# Patient Record
Sex: FEMALE | Race: OTHER | HISPANIC OR LATINO | URBAN - METROPOLITAN AREA
[De-identification: names, ages, dates, MRNs, and addresses within clinical notes are randomized per-mention and may not be internally consistent; named-entity substitution may affect disease eponyms.]

---

## 2021-04-14 ENCOUNTER — OFFICE VISIT (OUTPATIENT)
Dept: URBAN - METROPOLITAN AREA CLINIC 63 | Facility: CLINIC | Age: 66
End: 2021-04-14

## 2021-06-23 ENCOUNTER — OFFICE VISIT (OUTPATIENT)
Dept: URBAN - METROPOLITAN AREA CLINIC 63 | Facility: CLINIC | Age: 66
End: 2021-06-23

## 2021-08-13 ENCOUNTER — OFFICE VISIT (OUTPATIENT)
Dept: URBAN - METROPOLITAN AREA SURGERY CENTER 4 | Facility: SURGERY CENTER | Age: 66
End: 2021-08-13

## 2021-08-27 ENCOUNTER — OFFICE VISIT (OUTPATIENT)
Dept: URBAN - METROPOLITAN AREA SURGERY CENTER 4 | Facility: SURGERY CENTER | Age: 66
End: 2021-08-27

## 2021-08-31 LAB — PATHOLOGY (INDENTED REPORT): (no result)

## 2021-09-21 ENCOUNTER — OFFICE VISIT (OUTPATIENT)
Dept: URBAN - METROPOLITAN AREA TELEHEALTH 2 | Facility: TELEHEALTH | Age: 66
End: 2021-09-21

## 2021-11-30 ENCOUNTER — OFFICE VISIT (OUTPATIENT)
Dept: URBAN - METROPOLITAN AREA TELEHEALTH 2 | Facility: TELEHEALTH | Age: 66
End: 2021-11-30

## 2021-12-06 ENCOUNTER — OFFICE VISIT (OUTPATIENT)
Dept: URBAN - METROPOLITAN AREA CLINIC 63 | Facility: CLINIC | Age: 66
End: 2021-12-06

## 2022-07-09 ENCOUNTER — TELEPHONE ENCOUNTER (OUTPATIENT)
Dept: URBAN - METROPOLITAN AREA CLINIC 121 | Facility: CLINIC | Age: 67
End: 2022-07-09

## 2022-07-09 RX ORDER — ZOLPIDEM TARTRATE 5 MG/1
TABLET, FILM COATED ORAL ONCE A DAY
Refills: 0 | OUTPATIENT
Start: 2021-06-23 | End: 2021-12-06

## 2022-07-09 RX ORDER — CLOPIDOGREL 75 MG/1
TABLET ORAL ONCE A DAY
Refills: 0 | OUTPATIENT
Start: 2021-12-06 | End: 2021-12-06

## 2022-07-09 RX ORDER — CANDESARTAN CILEXETIL 16 MG/1
TABLET ORAL ONCE A DAY
Refills: 0 | OUTPATIENT
Start: 2021-06-23 | End: 2021-12-06

## 2022-07-09 RX ORDER — LOTEPREDNOL ETABONATE 5 MG/ML
SUSPENSION/ DROPS OPHTHALMIC ONCE A DAY
Refills: 0 | OUTPATIENT
Start: 2021-06-23 | End: 2021-12-06

## 2022-07-09 RX ORDER — FLUTICASONE PROPIONATE 50 UG/1
SPRAY, METERED NASAL ONCE A DAY
Refills: 0 | OUTPATIENT
Start: 2021-06-23 | End: 2021-12-06

## 2022-07-09 RX ORDER — AZELASTINE HYDROCHLORIDE 137 UG/1
SPRAY, METERED NASAL ONCE A DAY
Refills: 0 | OUTPATIENT
Start: 2021-06-23 | End: 2021-12-06

## 2022-07-09 RX ORDER — FLUTICASONE PROPIONATE 50 UG/1
SPRAY, METERED NASAL ONCE A DAY
Refills: 0 | OUTPATIENT
Start: 2021-12-06 | End: 2021-12-06

## 2022-07-09 RX ORDER — CLOPIDOGREL 75 MG/1
TABLET ORAL ONCE A DAY
Refills: 0 | OUTPATIENT
Start: 2021-06-23 | End: 2021-12-06

## 2022-07-09 RX ORDER — HYDROCHLOROTHIAZIDE 25 MG/1
TABLET ORAL ONCE A DAY
Refills: 0 | OUTPATIENT
Start: 2021-06-23 | End: 2021-12-06

## 2022-07-09 RX ORDER — SERTRALINE 25 MG/1
TABLET, FILM COATED ORAL ONCE A DAY
Refills: 0 | OUTPATIENT
Start: 2021-06-23 | End: 2021-12-06

## 2022-07-09 RX ORDER — PANTOPRAZOLE SODIUM 40 MG/1
TABLET, DELAYED RELEASE ORAL ONCE A DAY
Refills: 0 | OUTPATIENT
Start: 2021-06-23 | End: 2021-06-23

## 2022-07-10 ENCOUNTER — TELEPHONE ENCOUNTER (OUTPATIENT)
Dept: URBAN - METROPOLITAN AREA CLINIC 121 | Facility: CLINIC | Age: 67
End: 2022-07-10

## 2022-07-10 RX ORDER — SERTRALINE 25 MG/1
TABLET, FILM COATED ORAL ONCE A DAY
Refills: 0 | Status: ACTIVE | COMMUNITY
Start: 2021-12-06

## 2022-07-10 RX ORDER — LORATADINE 5 MG
ONCE A DAY TABLET,CHEWABLE ORAL ONCE A DAY
Refills: 1 | Status: ACTIVE | COMMUNITY
Start: 2021-12-06

## 2022-07-10 RX ORDER — HYDROCHLOROTHIAZIDE 25 MG/1
TABLET ORAL ONCE A DAY
Refills: 0 | Status: ACTIVE | COMMUNITY
Start: 2021-12-06

## 2022-07-10 RX ORDER — ZOLPIDEM TARTRATE 5 MG/1
TABLET, FILM COATED ORAL ONCE A DAY
Refills: 0 | Status: ACTIVE | COMMUNITY
Start: 2021-12-06

## 2022-07-10 RX ORDER — PANTOPRAZOLE SODIUM 40 MG/1
ONCE A DAY TABLET, DELAYED RELEASE ORAL ONCE A DAY
Refills: 0 | Status: ACTIVE | COMMUNITY
Start: 2021-06-23

## 2022-07-10 RX ORDER — LOTEPREDNOL ETABONATE 5 MG/ML
SUSPENSION/ DROPS OPHTHALMIC ONCE A DAY
Refills: 0 | Status: ACTIVE | COMMUNITY
Start: 2021-12-06

## 2022-07-10 RX ORDER — CANDESARTAN CILEXETIL 16 MG/1
TABLET ORAL ONCE A DAY
Refills: 0 | Status: ACTIVE | COMMUNITY
Start: 2021-12-06

## 2022-07-10 RX ORDER — AZELASTINE HYDROCHLORIDE 137 UG/1
SPRAY, METERED NASAL ONCE A DAY
Refills: 0 | Status: ACTIVE | COMMUNITY
Start: 2021-12-06

## 2022-10-05 ENCOUNTER — OFFICE VISIT (OUTPATIENT)
Dept: URBAN - METROPOLITAN AREA CLINIC 63 | Facility: CLINIC | Age: 67
End: 2022-10-05

## 2022-10-25 ENCOUNTER — OFFICE VISIT (OUTPATIENT)
Dept: URBAN - METROPOLITAN AREA CLINIC 63 | Facility: CLINIC | Age: 67
End: 2022-10-25
Payer: COMMERCIAL

## 2022-10-25 ENCOUNTER — WEB ENCOUNTER (OUTPATIENT)
Dept: URBAN - METROPOLITAN AREA CLINIC 63 | Facility: CLINIC | Age: 67
End: 2022-10-25

## 2022-10-25 VITALS
OXYGEN SATURATION: 97 % | HEIGHT: 60 IN | WEIGHT: 191.8 LBS | TEMPERATURE: 96.5 F | SYSTOLIC BLOOD PRESSURE: 130 MMHG | HEART RATE: 73 BPM | DIASTOLIC BLOOD PRESSURE: 86 MMHG | BODY MASS INDEX: 37.66 KG/M2

## 2022-10-25 DIAGNOSIS — R10.819 ABDOMINAL TENDERNESS, UNSPECIFIED SITE: ICD-10-CM

## 2022-10-25 PROCEDURE — 99214 OFFICE O/P EST MOD 30 MIN: CPT | Performed by: INTERNAL MEDICINE

## 2022-10-25 RX ORDER — SUCRALFATE 1 G/1
1 TABLET ON AN EMPTY STOMACH TABLET ORAL TWICE A DAY
Status: ACTIVE | COMMUNITY

## 2022-10-25 RX ORDER — HYDROCHLOROTHIAZIDE 25 MG/1
TABLET ORAL ONCE A DAY
Refills: 0 | Status: ACTIVE | COMMUNITY
Start: 2021-12-06

## 2022-10-25 RX ORDER — SERTRALINE 25 MG/1
TABLET, FILM COATED ORAL ONCE A DAY
Refills: 0 | Status: ACTIVE | COMMUNITY
Start: 2021-12-06

## 2022-10-25 RX ORDER — TRAZODONE HYDROCHLORIDE 50 MG/1
1 TABLET AT BEDTIME AS NEEDED TABLET ORAL ONCE A DAY
Status: ACTIVE | COMMUNITY

## 2022-10-25 RX ORDER — KRILL/OM-3/DHA/EPA/PHOSPHO/AST 1000-230MG
1 TABLET CAPSULE ORAL ONCE A DAY
Status: ACTIVE | COMMUNITY

## 2022-10-25 RX ORDER — TIOTROPIUM BROMIDE 18 UG/1
1 CAPSULE BY INHALING THE CONTENTS OF THE CAPSULE USING THE HANDIHALER DEVICE CAPSULE ORAL; RESPIRATORY (INHALATION) ONCE A DAY
Status: ACTIVE | COMMUNITY

## 2022-10-25 RX ORDER — CANDESARTAN CILEXETIL 16 MG/1
TABLET ORAL ONCE A DAY
Refills: 0 | Status: ACTIVE | COMMUNITY
Start: 2021-12-06

## 2022-10-25 RX ORDER — LOTEPREDNOL ETABONATE 5 MG/ML
SUSPENSION/ DROPS OPHTHALMIC ONCE A DAY
Refills: 0 | Status: ACTIVE | COMMUNITY
Start: 2021-12-06

## 2022-10-25 RX ORDER — MELOXICAM 15 MG/1
1 TABLET TABLET ORAL ONCE A DAY
Status: ACTIVE | COMMUNITY

## 2022-10-25 RX ORDER — PANTOPRAZOLE SODIUM 40 MG/1
ONCE A DAY TABLET, DELAYED RELEASE ORAL ONCE A DAY
Refills: 0 | Status: ACTIVE | COMMUNITY
Start: 2021-06-23

## 2022-10-25 RX ORDER — AZELASTINE HYDROCHLORIDE 137 UG/1
SPRAY, METERED NASAL ONCE A DAY
Refills: 0 | Status: ACTIVE | COMMUNITY
Start: 2021-12-06

## 2022-10-25 NOTE — HPI-TODAY'S VISIT:
Patient, with previous work-up done in [place] with a strong family history of colorectal cancer patient brother had colorectal cancer and another brother had gastric cancer.  Last colonoscopy was done 5 years ago with personal history of polyps.  We will plan to do surveillance colonoscopy.  Patient never had an upper endoscopy done, will plan upper endoscopy and colonoscopy the same day. Patient with chronic GERD on PPI. Will do diet and treartment for reflux, patient s/p cholecystectomy. Patient with plavix unclear the indication will do referral to cardiology  12/21:  Patient had upper endoscopy and colonoscopy 27 August of this year upper endoscopy showed normal esophagus with chronic gastritis and normal duodenum and colonoscopy show stricture in the sigmoid colon not able to pass regular scope the scope was changed to a pediatric scope and I was not able to pass that area reason why patient was sent for virtual colonography that same day patient had virtual colonography with the findings of bowel preparation and insufflation are adequate.  Mild diverticulosis involving the short segment of the distal descending colon which is mildly narrowing with minimal wall thickening.  No colon polyps or masses were visualized s/p cholecystectomy hepatic asteatosis and coronary artery calcifications degenerative changes throughout the spine and involving both hips with this finding patient will need to had a repeat colonoscopy or virtual colonography in 5 years.  Pathology report from the endoscopy showed normal duodenum, reactive gastropathy and chronic inactive gastritis with focal intestinal metaplasia negative for H. pylori negative for dysplasia.  Lower esophageal biopsies show fragments of squamous mucosa with gastroesophageal reflux negative for esophagitis, dysplasia or malignancy.  Negative for intestinal metaplasia.  We will continue treatment for acid reflux and diet.  Will consider repeated endoscopy in 3 years. Will need diet, exercise and weight loss, Will follow in 6 months. 10/22: Patient comes with  LLQ pain radiated to the left side of the abdomen.  With occasional diarrhea at this moment has no diarrhea but has chronic pain. The patient had a fall last July she fall on her  the left side. Patient had CT scan for renal stone and as per patient was normal.   Patient denies fever, bleeding but pain associated with occasional diarrhea. patient had terndernes to deep palpation to the LLQ. Patient has personal h/o sigmoid stensis and diverticulosis. May consider trial with antibiotics, and close follow up. Will do referral to colorectal for evaluation.

## 2022-11-15 ENCOUNTER — TELEPHONE ENCOUNTER (OUTPATIENT)
Dept: URBAN - METROPOLITAN AREA CLINIC 63 | Facility: CLINIC | Age: 67
End: 2022-11-15

## 2022-12-27 ENCOUNTER — OFFICE VISIT (OUTPATIENT)
Dept: URBAN - METROPOLITAN AREA CLINIC 63 | Facility: CLINIC | Age: 67
End: 2022-12-27

## 2023-01-25 ENCOUNTER — OFFICE VISIT (OUTPATIENT)
Dept: URBAN - METROPOLITAN AREA CLINIC 63 | Facility: CLINIC | Age: 68
End: 2023-01-25

## 2023-03-08 ENCOUNTER — OFFICE VISIT (OUTPATIENT)
Dept: URBAN - METROPOLITAN AREA CLINIC 63 | Facility: CLINIC | Age: 68
End: 2023-03-08
Payer: COMMERCIAL

## 2023-03-08 VITALS
HEART RATE: 76 BPM | BODY MASS INDEX: 37.69 KG/M2 | HEIGHT: 60 IN | WEIGHT: 192 LBS | SYSTOLIC BLOOD PRESSURE: 112 MMHG | DIASTOLIC BLOOD PRESSURE: 82 MMHG | TEMPERATURE: 96.6 F | OXYGEN SATURATION: 95 %

## 2023-03-08 DIAGNOSIS — R10.819 ABDOMINAL TENDERNESS, UNSPECIFIED SITE: ICD-10-CM

## 2023-03-08 DIAGNOSIS — Z80.0 FAMILY HISTORY OF MALIGNANT NEOPLASM OF DIGESTIVE ORGANS: ICD-10-CM

## 2023-03-08 DIAGNOSIS — K56.609 UNSPECIFIED INTESTINAL OBSTRUCTION, UNSPECIFIED AS TO PARTIAL VERSUS COMPLETE OBSTRUCTION: ICD-10-CM

## 2023-03-08 DIAGNOSIS — K57.32 DIVERTICULITIS OF LARGE INTESTINE WITHOUT PERFORATION OR ABSCESS WITHOUT BLEEDING: ICD-10-CM

## 2023-03-08 PROBLEM — 4494009: Status: ACTIVE | Noted: 2023-03-08

## 2023-03-08 PROCEDURE — 99213 OFFICE O/P EST LOW 20 MIN: CPT | Performed by: INTERNAL MEDICINE

## 2023-03-08 RX ORDER — CANDESARTAN CILEXETIL 16 MG/1
TABLET ORAL ONCE A DAY
Refills: 0 | Status: ACTIVE | COMMUNITY
Start: 2021-12-06

## 2023-03-08 RX ORDER — SUCRALFATE 1 G/1
1 TABLET ON AN EMPTY STOMACH TABLET ORAL TWICE A DAY
Status: ACTIVE | COMMUNITY

## 2023-03-08 RX ORDER — PANTOPRAZOLE SODIUM 40 MG/1
ONCE A DAY TABLET, DELAYED RELEASE ORAL ONCE A DAY
Refills: 0 | Status: ACTIVE | COMMUNITY
Start: 2021-06-23

## 2023-03-08 RX ORDER — KRILL/OM-3/DHA/EPA/PHOSPHO/AST 1000-230MG
1 TABLET CAPSULE ORAL ONCE A DAY
Status: ACTIVE | COMMUNITY

## 2023-03-08 RX ORDER — MELOXICAM 15 MG/1
1 TABLET TABLET ORAL ONCE A DAY
Status: ACTIVE | COMMUNITY

## 2023-03-08 RX ORDER — AZELASTINE HYDROCHLORIDE 137 UG/1
SPRAY, METERED NASAL ONCE A DAY
Refills: 0 | Status: ACTIVE | COMMUNITY
Start: 2021-12-06

## 2023-03-08 RX ORDER — TIOTROPIUM BROMIDE 18 UG/1
1 CAPSULE BY INHALING THE CONTENTS OF THE CAPSULE USING THE HANDIHALER DEVICE CAPSULE ORAL; RESPIRATORY (INHALATION) ONCE A DAY
Status: ACTIVE | COMMUNITY

## 2023-03-08 RX ORDER — LOTEPREDNOL ETABONATE 5 MG/ML
SUSPENSION/ DROPS OPHTHALMIC ONCE A DAY
Refills: 0 | Status: ACTIVE | COMMUNITY
Start: 2021-12-06

## 2023-03-08 NOTE — HPI-TODAY'S VISIT:
Patient, with previous work-up done in [place] with a strong family history of colorectal cancer patient brother had colorectal cancer and another brother had gastric cancer.  Last colonoscopy was done 5 years ago with personal history of polyps.  We will plan to do surveillance colonoscopy.  Patient never had an upper endoscopy done, will plan upper endoscopy and colonoscopy the same day. Patient with chronic GERD on PPI. Will do diet and treartment for reflux, patient s/p cholecystectomy. Patient with plavix unclear the indication will do referral to cardiology  12/21:  Patient had upper endoscopy and colonoscopy 27 August of this year upper endoscopy showed normal esophagus with chronic gastritis and normal duodenum and colonoscopy show stricture in the sigmoid colon not able to pass regular scope the scope was changed to a pediatric scope and I was not able to pass that area reason why patient was sent for virtual colonography that same day patient had virtual colonography with the findings of bowel preparation and insufflation are adequate.  Mild diverticulosis involving the short segment of the distal descending colon which is mildly narrowing with minimal wall thickening.  No colon polyps or masses were visualized s/p cholecystectomy hepatic asteatosis and coronary artery calcifications degenerative changes throughout the spine and involving both hips with this finding patient will need to had a repeat colonoscopy or virtual colonography in 5 years.  Pathology report from the endoscopy showed normal duodenum, reactive gastropathy and chronic inactive gastritis with focal intestinal metaplasia negative for H. pylori negative for dysplasia.  Lower esophageal biopsies show fragments of squamous mucosa with gastroesophageal reflux negative for esophagitis, dysplasia or malignancy.  Negative for intestinal metaplasia.  We will continue treatment for acid reflux and diet.  Will consider repeated endoscopy in 3 years. Will need diet, exercise and weight loss, Will follow in 6 months. 10/22: Patient comes with  LLQ pain radiated to the left side of the abdomen.  With occasional diarrhea at this moment has no diarrhea but has chronic pain. The patient had a fall last July she fall on her  the left side. Patient had CT scan for renal stone and as per patient was normal.   Patient denies fever, bleeding but pain associated with occasional diarrhea. patient had terndernes to deep palpation to the LLQ. Patient has personal h/o sigmoid stensis and diverticulosis. May consider trial with antibiotics, and close follow up. Will do referral to colorectal for evaluation. 3/23: Patient was seen by Dr Almaguer, with persistent LLQ , patient needs to make a decision, likely she will do partial resection of the sigmoid colon to avoid symptoms and further complications. Will defer management to colorectal.

## 2023-09-27 ENCOUNTER — OFFICE VISIT (OUTPATIENT)
Dept: URBAN - METROPOLITAN AREA CLINIC 63 | Facility: CLINIC | Age: 68
End: 2023-09-27

## 2023-12-05 ENCOUNTER — OFFICE VISIT (OUTPATIENT)
Dept: URBAN - METROPOLITAN AREA CLINIC 63 | Facility: CLINIC | Age: 68
End: 2023-12-05
Payer: COMMERCIAL

## 2023-12-05 ENCOUNTER — DASHBOARD ENCOUNTERS (OUTPATIENT)
Age: 68
End: 2023-12-05

## 2023-12-05 VITALS
DIASTOLIC BLOOD PRESSURE: 60 MMHG | OXYGEN SATURATION: 94 % | HEIGHT: 60 IN | HEART RATE: 73 BPM | BODY MASS INDEX: 36.12 KG/M2 | WEIGHT: 184 LBS | TEMPERATURE: 97.2 F | SYSTOLIC BLOOD PRESSURE: 118 MMHG

## 2023-12-05 DIAGNOSIS — K57.90 DIVERTICULOSIS: ICD-10-CM

## 2023-12-05 DIAGNOSIS — Z80.0 FAMILY HISTORY OF MALIGNANT NEOPLASM OF DIGESTIVE ORGANS: ICD-10-CM

## 2023-12-05 DIAGNOSIS — K56.609 UNSPECIFIED INTESTINAL OBSTRUCTION, UNSPECIFIED AS TO PARTIAL VERSUS COMPLETE OBSTRUCTION: ICD-10-CM

## 2023-12-05 DIAGNOSIS — K21.9 CHRONIC GERD: ICD-10-CM

## 2023-12-05 PROCEDURE — 99214 OFFICE O/P EST MOD 30 MIN: CPT | Performed by: INTERNAL MEDICINE

## 2023-12-05 RX ORDER — TIOTROPIUM BROMIDE 18 UG/1
1 CAPSULE BY INHALING THE CONTENTS OF THE CAPSULE USING THE HANDIHALER DEVICE CAPSULE ORAL; RESPIRATORY (INHALATION) ONCE A DAY
Status: ACTIVE | COMMUNITY

## 2023-12-05 RX ORDER — PANTOPRAZOLE SODIUM 40 MG/1
ONCE A DAY TABLET, DELAYED RELEASE ORAL ONCE A DAY
Refills: 0 | Status: ACTIVE | COMMUNITY
Start: 2021-06-23

## 2023-12-05 RX ORDER — CANDESARTAN CILEXETIL 16 MG/1
TABLET ORAL ONCE A DAY
Refills: 0 | Status: ACTIVE | COMMUNITY
Start: 2021-12-06

## 2023-12-05 RX ORDER — KRILL/OM-3/DHA/EPA/PHOSPHO/AST 1000-230MG
1 TABLET CAPSULE ORAL ONCE A DAY
Status: ACTIVE | COMMUNITY

## 2023-12-05 NOTE — HPI-TODAY'S VISIT:
Patient, with previous work-up done in [place] with a strong family history of colorectal cancer patient brother had colorectal cancer and another brother had gastric cancer.  Last colonoscopy was done 5 years ago with personal history of polyps.  We will plan to do surveillance colonoscopy.  Patient never had an upper endoscopy done, will plan upper endoscopy and colonoscopy the same day. Patient with chronic GERD on PPI. Will do diet and treartment for reflux, patient s/p cholecystectomy. Patient with plavix unclear the indication will do referral to cardiology  12/21:  Patient had upper endoscopy and colonoscopy 27 August of this year upper endoscopy showed normal esophagus with chronic gastritis and normal duodenum and colonoscopy show stricture in the sigmoid colon not able to pass regular scope the scope was changed to a pediatric scope and I was not able to pass that area reason why patient was sent for virtual colonography that same day patient had virtual colonography with the findings of bowel preparation and insufflation are adequate.  Mild diverticulosis involving the short segment of the distal descending colon which is mildly narrowing with minimal wall thickening.  No colon polyps or masses were visualized s/p cholecystectomy hepatic asteatosis and coronary artery calcifications degenerative changes throughout the spine and involving both hips with this finding patient will need to had a repeat colonoscopy or virtual colonography in 5 years.  Pathology report from the endoscopy showed normal duodenum, reactive gastropathy and chronic inactive gastritis with focal intestinal metaplasia negative for H. pylori negative for dysplasia.  Lower esophageal biopsies show fragments of squamous mucosa with gastroesophageal reflux negative for esophagitis, dysplasia or malignancy.  Negative for intestinal metaplasia.  We will continue treatment for acid reflux and diet.  Will consider repeated endoscopy in 3 years. Will need diet, exercise and weight loss, Will follow in 6 months. 10/22: Patient comes with  LLQ pain radiated to the left side of the abdomen.  With occasional diarrhea at this moment has no diarrhea but has chronic pain. The patient had a fall last July she fall on her  the left side. Patient had CT scan for renal stone and as per patient was normal.   Patient denies fever, bleeding but pain associated with occasional diarrhea. patient had terndernes to deep palpation to the LLQ. Patient has personal h/o sigmoid stensis and diverticulosis. May consider trial with antibiotics, and close follow up. Will do referral to colorectal for evaluation. 3/23: Patient was seen by Dr Almaguer, with persistent LLQ , patient needs to make a decision, likely she will do partial resection of the sigmoid colon to avoid symptoms and further complications. Will defer management to colorectal. 12/23: Patient is being seen after surgical treatment of the sigmoid stricture, patient had a laparoscopic assisted low anterior resection with spy fluorescence infrared imaging with intraoperative interpretation patient with diverticulosis mild because of the stricture, now healing well following good with regular bowel movement, will plan colonoscopy, next year, will wait at least 6-month after procedure that was done in October of this year to do a colonoscopy, evaluation of the right colon that was not able to visualize because of the stricture in the sigmoid colon.  Patient is doing well will follow-up as needed basis.

## 2024-09-18 ENCOUNTER — LAB OUTSIDE AN ENCOUNTER (OUTPATIENT)
Dept: URBAN - METROPOLITAN AREA CLINIC 63 | Facility: CLINIC | Age: 69
End: 2024-09-18

## 2024-09-18 ENCOUNTER — OFFICE VISIT (OUTPATIENT)
Dept: URBAN - METROPOLITAN AREA CLINIC 63 | Facility: CLINIC | Age: 69
End: 2024-09-18
Payer: COMMERCIAL

## 2024-09-18 VITALS
DIASTOLIC BLOOD PRESSURE: 70 MMHG | HEIGHT: 60 IN | WEIGHT: 188 LBS | HEART RATE: 75 BPM | TEMPERATURE: 97.4 F | OXYGEN SATURATION: 97 % | BODY MASS INDEX: 36.91 KG/M2 | SYSTOLIC BLOOD PRESSURE: 105 MMHG

## 2024-09-18 DIAGNOSIS — K22.9 DISEASE OF ESOPHAGUS, UNSPECIFIED: ICD-10-CM

## 2024-09-18 DIAGNOSIS — D12.6 BENIGN NEOPLASM OF COLON, UNSPECIFIED: ICD-10-CM

## 2024-09-18 DIAGNOSIS — K57.32 DIVERTICULITIS OF LARGE INTESTINE WITHOUT PERFORATION OR ABSCESS WITHOUT BLEEDING: ICD-10-CM

## 2024-09-18 DIAGNOSIS — Z80.0 FAMILY HISTORY OF MALIGNANT NEOPLASM OF DIGESTIVE ORGANS: ICD-10-CM

## 2024-09-18 PROCEDURE — 99214 OFFICE O/P EST MOD 30 MIN: CPT | Performed by: INTERNAL MEDICINE

## 2024-09-18 RX ORDER — CANDESARTAN CILEXETIL 16 MG/1
TABLET ORAL ONCE A DAY
Refills: 0 | Status: ACTIVE | COMMUNITY
Start: 2021-12-06

## 2024-09-18 RX ORDER — TIOTROPIUM BROMIDE 18 UG/1
1 CAPSULE BY INHALING THE CONTENTS OF THE CAPSULE USING THE HANDIHALER DEVICE CAPSULE ORAL; RESPIRATORY (INHALATION) ONCE A DAY
Status: ACTIVE | COMMUNITY

## 2024-09-18 RX ORDER — KRILL/OM-3/DHA/EPA/PHOSPHO/AST 1000-230MG
1 TABLET CAPSULE ORAL ONCE A DAY
Status: ACTIVE | COMMUNITY

## 2024-09-18 RX ORDER — PANTOPRAZOLE SODIUM 40 MG/1
ONCE A DAY TABLET, DELAYED RELEASE ORAL ONCE A DAY
Refills: 0 | Status: ACTIVE | COMMUNITY
Start: 2021-06-23

## 2024-09-18 NOTE — HPI-TODAY'S VISIT:
Patient, with previous work-up done in [place] with a strong family history of colorectal cancer patient brother had colorectal cancer and another brother had gastric cancer.  Last colonoscopy was done 5 years ago with personal history of polyps.  We will plan to do surveillance colonoscopy.  Patient never had an upper endoscopy done, will plan upper endoscopy and colonoscopy the same day. Patient with chronic GERD on PPI. Will do diet and treartment for reflux, patient s/p cholecystectomy. Patient with plavix unclear the indication will do referral to cardiology  12/21:  Patient had upper endoscopy and colonoscopy 27 August of this year upper endoscopy showed normal esophagus with chronic gastritis and normal duodenum and colonoscopy show stricture in the sigmoid colon not able to pass regular scope the scope was changed to a pediatric scope and I was not able to pass that area reason why patient was sent for virtual colonography that same day patient had virtual colonography with the findings of bowel preparation and insufflation are adequate.  Mild diverticulosis involving the short segment of the distal descending colon which is mildly narrowing with minimal wall thickening.  No colon polyps or masses were visualized s/p cholecystectomy hepatic asteatosis and coronary artery calcifications degenerative changes throughout the spine and involving both hips with this finding patient will need to had a repeat colonoscopy or virtual colonography in 5 years.  Pathology report from the endoscopy showed normal duodenum, reactive gastropathy and chronic inactive gastritis with focal intestinal metaplasia negative for H. pylori negative for dysplasia.  Lower esophageal biopsies show fragments of squamous mucosa with gastroesophageal reflux negative for esophagitis, dysplasia or malignancy.  Negative for intestinal metaplasia.  We will continue treatment for acid reflux and diet.  Will consider repeated endoscopy in 3 years. Will need diet, exercise and weight loss, Will follow in 6 months. 10/22: Patient comes with  LLQ pain radiated to the left side of the abdomen.  With occasional diarrhea at this moment has no diarrhea but has chronic pain. The patient had a fall last July she fall on her  the left side. Patient had CT scan for renal stone and as per patient was normal.   Patient denies fever, bleeding but pain associated with occasional diarrhea. patient had terndernes to deep palpation to the LLQ. Patient has personal h/o sigmoid stensis and diverticulosis. May consider trial with antibiotics, and close follow up. Will do referral to colorectal for evaluation. 3/23: Patient was seen by Dr Almaguer, with persistent LLQ , patient needs to make a decision, likely she will do partial resection of the sigmoid colon to avoid symptoms and further complications. Will defer management to colorectal. 12/23: Patient is being seen after surgical treatment of the sigmoid stricture, patient had a laparoscopic assisted low anterior resection with spy fluorescence infrared imaging with intraoperative interpretation patient with diverticulosis mild because of the stricture, now healing well following good with regular bowel movement, will plan colonoscopy, next year, will wait at least 6-month after procedure that was done in October of this year to do a colonoscopy, evaluation of the right colon that was not able to visualize because of the stricture in the sigmoid colon.  Patient is doing well will follow-up as needed basis. 9/24: Patient s/p partial resection of the colon sigmodectomy had sigmoid stricture  secondary to sigmoid diverticulosis, will plan colonoscopy. Patient with some discomfort but not pain, will do trial with probiotics and will avoid constipation.

## 2024-12-10 ENCOUNTER — OFFICE VISIT (OUTPATIENT)
Dept: URBAN - METROPOLITAN AREA SURGERY CENTER 4 | Facility: SURGERY CENTER | Age: 69
End: 2024-12-10

## 2025-01-08 ENCOUNTER — TELEPHONE ENCOUNTER (OUTPATIENT)
Dept: URBAN - METROPOLITAN AREA CLINIC 63 | Facility: CLINIC | Age: 70
End: 2025-01-08

## 2025-01-24 ENCOUNTER — CLAIMS CREATED FROM THE CLAIM WINDOW (OUTPATIENT)
Dept: URBAN - METROPOLITAN AREA CLINIC 4 | Facility: CLINIC | Age: 70
End: 2025-01-24
Payer: COMMERCIAL

## 2025-01-24 ENCOUNTER — CLAIMS CREATED FROM THE CLAIM WINDOW (OUTPATIENT)
Dept: URBAN - METROPOLITAN AREA SURGERY CENTER 4 | Facility: SURGERY CENTER | Age: 70
End: 2025-01-24
Payer: COMMERCIAL

## 2025-01-24 DIAGNOSIS — Z86.0100 PERSONAL HISTORY OF COLON POLYPS, UNSPECIFIED: ICD-10-CM

## 2025-01-24 DIAGNOSIS — D12.4 BENIGN NEOPLASM OF DESCENDING COLON: ICD-10-CM

## 2025-01-24 DIAGNOSIS — Z09 ENCOUNTER FOR FOLLOW-UP EXAMINATION AFTER COMPLETED TREATMENT FOR CONDITIONS OTHER THAN MALIGNANT NEOPLASM: ICD-10-CM

## 2025-01-24 DIAGNOSIS — K63.5 POLYP OF DESCENDING COLON, UNSPECIFIED TYPE: ICD-10-CM

## 2025-01-24 DIAGNOSIS — Z80.0 FAMILY HISTORY OF MALIGNANT NEOPLASM OF DIGESTIVE ORGANS: ICD-10-CM

## 2025-01-24 DIAGNOSIS — K64.1 SECOND DEGREE HEMORRHOIDS: ICD-10-CM

## 2025-01-24 PROCEDURE — 0529F INTRVL 3/>YR PTS CLNSCP DOCD: CPT | Performed by: INTERNAL MEDICINE

## 2025-01-24 PROCEDURE — 00811 ANES LWR INTST NDSC NOS: CPT | Performed by: NURSE ANESTHETIST, CERTIFIED REGISTERED

## 2025-01-24 PROCEDURE — 45380 COLONOSCOPY AND BIOPSY: CPT | Performed by: INTERNAL MEDICINE

## 2025-01-24 PROCEDURE — 88305 TISSUE EXAM BY PATHOLOGIST: CPT | Performed by: PATHOLOGY

## 2025-01-24 RX ORDER — CANDESARTAN CILEXETIL 16 MG/1
TABLET ORAL ONCE A DAY
Refills: 0 | Status: ACTIVE | COMMUNITY
Start: 2021-12-06

## 2025-01-24 RX ORDER — PANTOPRAZOLE SODIUM 40 MG/1
ONCE A DAY TABLET, DELAYED RELEASE ORAL ONCE A DAY
Refills: 0 | Status: ACTIVE | COMMUNITY
Start: 2021-06-23

## 2025-01-24 RX ORDER — TIOTROPIUM BROMIDE 18 UG/1
1 CAPSULE BY INHALING THE CONTENTS OF THE CAPSULE USING THE HANDIHALER DEVICE CAPSULE ORAL; RESPIRATORY (INHALATION) ONCE A DAY
Status: ACTIVE | COMMUNITY

## 2025-01-24 RX ORDER — KRILL/OM-3/DHA/EPA/PHOSPHO/AST 1000-230MG
1 TABLET CAPSULE ORAL ONCE A DAY
Status: ACTIVE | COMMUNITY

## 2025-02-03 ENCOUNTER — OFFICE VISIT (OUTPATIENT)
Dept: URBAN - METROPOLITAN AREA CLINIC 60 | Facility: CLINIC | Age: 70
End: 2025-02-03

## 2025-02-11 ENCOUNTER — OFFICE VISIT (OUTPATIENT)
Dept: URBAN - METROPOLITAN AREA CLINIC 60 | Facility: CLINIC | Age: 70
End: 2025-02-11

## 2025-02-11 RX ORDER — KRILL/OM-3/DHA/EPA/PHOSPHO/AST 1000-230MG
1 TABLET CAPSULE ORAL ONCE A DAY
Status: ACTIVE | COMMUNITY

## 2025-02-11 RX ORDER — CANDESARTAN CILEXETIL 16 MG/1
TABLET ORAL ONCE A DAY
Refills: 0 | Status: ACTIVE | COMMUNITY
Start: 2021-12-06

## 2025-02-11 RX ORDER — PANTOPRAZOLE SODIUM 40 MG/1
ONCE A DAY TABLET, DELAYED RELEASE ORAL ONCE A DAY
Refills: 0 | Status: ACTIVE | COMMUNITY
Start: 2021-06-23

## 2025-02-11 RX ORDER — TIOTROPIUM BROMIDE 18 UG/1
1 CAPSULE BY INHALING THE CONTENTS OF THE CAPSULE USING THE HANDIHALER DEVICE CAPSULE ORAL; RESPIRATORY (INHALATION) ONCE A DAY
Status: ACTIVE | COMMUNITY

## 2025-04-07 ENCOUNTER — OFFICE VISIT (OUTPATIENT)
Dept: URBAN - METROPOLITAN AREA CLINIC 63 | Facility: CLINIC | Age: 70
End: 2025-04-07
Payer: COMMERCIAL

## 2025-04-07 ENCOUNTER — TELEPHONE ENCOUNTER (OUTPATIENT)
Dept: URBAN - METROPOLITAN AREA CLINIC 63 | Facility: CLINIC | Age: 70
End: 2025-04-07

## 2025-04-07 DIAGNOSIS — Z80.0 FAMILY HISTORY OF MALIGNANT NEOPLASM OF DIGESTIVE ORGANS: ICD-10-CM

## 2025-04-07 DIAGNOSIS — Z90.49 S/P LAPAROSCOPIC-ASSISTED SIGMOIDECTOMY: ICD-10-CM

## 2025-04-07 DIAGNOSIS — D12.6 TUBULAR ADENOMA OF COLON: ICD-10-CM

## 2025-04-07 PROCEDURE — 99214 OFFICE O/P EST MOD 30 MIN: CPT | Performed by: INTERNAL MEDICINE

## 2025-04-07 RX ORDER — TIOTROPIUM BROMIDE 18 UG/1
1 CAPSULE BY INHALING THE CONTENTS OF THE CAPSULE USING THE HANDIHALER DEVICE CAPSULE ORAL; RESPIRATORY (INHALATION) ONCE A DAY
Status: ACTIVE | COMMUNITY

## 2025-04-07 RX ORDER — CANDESARTAN CILEXETIL 16 MG/1
TABLET ORAL ONCE A DAY
Refills: 0 | Status: ACTIVE | COMMUNITY
Start: 2021-12-06

## 2025-04-07 NOTE — HPI-TODAY'S VISIT:
Patient, with previous work-up done in [place] with a strong family history of colorectal cancer patient brother had colorectal cancer and another brother had gastric cancer.  Last colonoscopy was done 5 years ago with personal history of polyps.  We will plan to do surveillance colonoscopy.  Patient never had an upper endoscopy done, will plan upper endoscopy and colonoscopy the same day. Patient with chronic GERD on PPI. Will do diet and treartment for reflux, patient s/p cholecystectomy. Patient with plavix unclear the indication will do referral to cardiology  12/21:  Patient had upper endoscopy and colonoscopy 27 August of this year upper endoscopy showed normal esophagus with chronic gastritis and normal duodenum and colonoscopy show stricture in the sigmoid colon not able to pass regular scope the scope was changed to a pediatric scope and I was not able to pass that area reason why patient was sent for virtual colonography that same day patient had virtual colonography with the findings of bowel preparation and insufflation are adequate.  Mild diverticulosis involving the short segment of the distal descending colon which is mildly narrowing with minimal wall thickening.  No colon polyps or masses were visualized s/p cholecystectomy hepatic asteatosis and coronary artery calcifications degenerative changes throughout the spine and involving both hips with this finding patient will need to had a repeat colonoscopy or virtual colonography in 5 years.  Pathology report from the endoscopy showed normal duodenum, reactive gastropathy and chronic inactive gastritis with focal intestinal metaplasia negative for H. pylori negative for dysplasia.  Lower esophageal biopsies show fragments of squamous mucosa with gastroesophageal reflux negative for esophagitis, dysplasia or malignancy.  Negative for intestinal metaplasia.  We will continue treatment for acid reflux and diet.  Will consider repeated endoscopy in 3 years. Will need diet, exercise and weight loss, Will follow in 6 months. 10/22: Patient comes with  LLQ pain radiated to the left side of the abdomen.  With occasional diarrhea at this moment has no diarrhea but has chronic pain. The patient had a fall last July she fall on her  the left side. Patient had CT scan for renal stone and as per patient was normal.   Patient denies fever, bleeding but pain associated with occasional diarrhea. patient had terndernes to deep palpation to the LLQ. Patient has personal h/o sigmoid stensis and diverticulosis. May consider trial with antibiotics, and close follow up. Will do referral to colorectal for evaluation. 3/23: Patient was seen by Dr Almaguer, with persistent LLQ , patient needs to make a decision, likely she will do partial resection of the sigmoid colon to avoid symptoms and further complications. Will defer management to colorectal. 12/23: Patient is being seen after surgical treatment of the sigmoid stricture, patient had a laparoscopic assisted low anterior resection with spy fluorescence infrared imaging with intraoperative interpretation patient with diverticulosis mild because of the stricture, now healing well following good with regular bowel movement, will plan colonoscopy, next year, will wait at least 6-month after procedure that was done in October of this year to do a colonoscopy, evaluation of the right colon that was not able to visualize because of the stricture in the sigmoid colon.  Patient is doing well will follow-up as needed basis. 9/24: Patient s/p partial resection of the colon sigmodectomy had sigmoid stricture  secondary to sigmoid diverticulosis, will plan colonoscopy. Patient with some discomfort but not pain, will do trial with probiotics and will avoid constipation. 4/25: Patient is being seen in the office after recent colonoscopy that was performed 24 January of 2025 with evidence of 3 small polyp between 4 to 6 mm in diameter in the descending colon removed with cold biopsy forcep polyps that were tubular adenomas, normal colonic anastomosis, normal distal portion of the terminal ileum, and internal hemorrhoids.  Was recommended to repeat a colonoscopy in 3 years.  Patient is doing well after procedure With occasional constipation. will increase fiber and water.  Will follow as needed basis.

## 2025-07-15 ENCOUNTER — TELEPHONE ENCOUNTER (OUTPATIENT)
Dept: URBAN - METROPOLITAN AREA CLINIC 63 | Facility: CLINIC | Age: 70
End: 2025-07-15